# Patient Record
Sex: FEMALE | Race: WHITE | Employment: FULL TIME | URBAN - METROPOLITAN AREA
[De-identification: names, ages, dates, MRNs, and addresses within clinical notes are randomized per-mention and may not be internally consistent; named-entity substitution may affect disease eponyms.]

---

## 2019-07-03 ENCOUNTER — APPOINTMENT (OUTPATIENT)
Dept: GENERAL RADIOLOGY | Age: 59
End: 2019-07-03
Attending: EMERGENCY MEDICINE
Payer: SELF-PAY

## 2019-07-03 ENCOUNTER — HOSPITAL ENCOUNTER (EMERGENCY)
Age: 59
Discharge: HOME | End: 2019-07-03
Attending: EMERGENCY MEDICINE | Admitting: EMERGENCY MEDICINE
Payer: SELF-PAY

## 2019-07-03 VITALS
TEMPERATURE: 97.8 F | DIASTOLIC BLOOD PRESSURE: 86 MMHG | SYSTOLIC BLOOD PRESSURE: 184 MMHG | HEIGHT: 65 IN | RESPIRATION RATE: 18 BRPM | OXYGEN SATURATION: 99 % | BODY MASS INDEX: 21.66 KG/M2 | HEART RATE: 84 BPM | WEIGHT: 130 LBS

## 2019-07-03 DIAGNOSIS — S92.902A CLOSED FRACTURE OF LEFT FOOT, INITIAL ENCOUNTER: Primary | ICD-10-CM

## 2019-07-03 PROCEDURE — 74011250637 HC RX REV CODE- 250/637: Performed by: EMERGENCY MEDICINE

## 2019-07-03 PROCEDURE — 77030008299 HC SPLNT FBRGLS SCTCH 3M -B

## 2019-07-03 PROCEDURE — 99283 EMERGENCY DEPT VISIT LOW MDM: CPT | Performed by: EMERGENCY MEDICINE

## 2019-07-03 PROCEDURE — 75810000053 HC SPLINT APPLICATION: Performed by: EMERGENCY MEDICINE

## 2019-07-03 PROCEDURE — 99284 EMERGENCY DEPT VISIT MOD MDM: CPT | Performed by: EMERGENCY MEDICINE

## 2019-07-03 PROCEDURE — 73630 X-RAY EXAM OF FOOT: CPT

## 2019-07-03 RX ORDER — HYDROCODONE BITARTRATE AND ACETAMINOPHEN 5; 325 MG/1; MG/1
1 TABLET ORAL
Qty: 20 TAB | Refills: 0 | Status: SHIPPED | OUTPATIENT
Start: 2019-07-03 | End: 2019-07-06

## 2019-07-03 RX ORDER — HYDROCODONE BITARTRATE AND ACETAMINOPHEN 5; 325 MG/1; MG/1
1 TABLET ORAL ONCE
Status: COMPLETED | OUTPATIENT
Start: 2019-07-03 | End: 2019-07-03

## 2019-07-03 RX ADMIN — HYDROCODONE BITARTRATE AND ACETAMINOPHEN 1 TABLET: 5; 325 TABLET ORAL at 10:09

## 2019-07-03 NOTE — ED NOTES
I have reviewed discharge instructions with the patient. The patient verbalized understanding. Patient left ED via Discharge Method: ambulatory to Home with grand daughter. Opportunity for questions and clarification provided. Patient given 1 scripts. To continue your aftercare when you leave the hospital, you may receive an automated call from our care team to check in on how you are doing. This is a free service and part of our promise to provide the best care and service to meet your aftercare needs.  If you have questions, or wish to unsubscribe from this service please call [phone]. Thank you for Choosing our [facility] Emergency Department.

## 2019-07-03 NOTE — ED PROVIDER NOTES
59-year-old female tripped over the carpet last night causing her left foot to bend back behind her. She reports significant pain and swelling in the dorsal foot radiating into her ankle. Denies knee pain. No other injury. Foot Injury    Pertinent negatives include no numbness. History reviewed. No pertinent past medical history. Past Surgical History:   Procedure Laterality Date    HX CHOLECYSTECTOMY      HX GYN           History reviewed. No pertinent family history. Social History     Socioeconomic History    Marital status:      Spouse name: Not on file    Number of children: Not on file    Years of education: Not on file    Highest education level: Not on file   Occupational History    Not on file   Social Needs    Financial resource strain: Not on file    Food insecurity:     Worry: Not on file     Inability: Not on file    Transportation needs:     Medical: Not on file     Non-medical: Not on file   Tobacco Use    Smoking status: Current Every Day Smoker    Smokeless tobacco: Never Used   Substance and Sexual Activity    Alcohol use: Never     Frequency: Never    Drug use: Never    Sexual activity: Not on file   Lifestyle    Physical activity:     Days per week: Not on file     Minutes per session: Not on file    Stress: Not on file   Relationships    Social connections:     Talks on phone: Not on file     Gets together: Not on file     Attends Hindu service: Not on file     Active member of club or organization: Not on file     Attends meetings of clubs or organizations: Not on file     Relationship status: Not on file    Intimate partner violence:     Fear of current or ex partner: Not on file     Emotionally abused: Not on file     Physically abused: Not on file     Forced sexual activity: Not on file   Other Topics Concern    Not on file   Social History Narrative    Not on file         ALLERGIES: Patient has no known allergies.     Review of Systems Constitutional: Negative for fever. Musculoskeletal: Positive for arthralgias and joint swelling. Neurological: Negative for weakness and numbness. Vitals:    07/03/19 0901   BP: (!) 191/97   Pulse: 84   Resp: 18   Temp: 97.8 °F (36.6 °C)   SpO2: 98%   Weight: 59 kg (130 lb)   Height: 5' 5\" (1.651 m)            Physical Exam   Constitutional: She appears well-developed and well-nourished. HENT:   Head: Normocephalic and atraumatic. Eyes: Pupils are equal, round, and reactive to light. EOM are normal.   Neck: Neck supple. Musculoskeletal:        Left foot: There is decreased range of motion, tenderness, bony tenderness and swelling. There is no crepitus. Feet:    Neurological: She is alert. She has normal strength. No sensory deficit. Skin: Skin is dry. Nursing note and vitals reviewed. MDM  Number of Diagnoses or Management Options  Diagnosis management comments: Parts of this document were created using dragon voice recognition software. The chart has been reviewed but errors may still be present. Significant swelling suggesting ligamentous injury. No evidence of Lisfranc injury, but placed in a posterior splint and given crutches with worth of follow-up for further evaluation and imaging. dw Dr Moira García, ortho, for fu. I discussed the results of all labs, procedures, radiographs, and treatments with the patient and available family. Treatment plan is agreed upon and the patient is ready for discharge. Questions about treatment in the ED and differential diagnosis of presenting condition were answered. Patient was given verbal discharge instructions including, but not limited to, importance of returning to the emergency department for any concern of worsening or continued symptoms. Instructions were given to follow up with a primary care provider or specialist within 1-2 days.   Adverse effects of medications, if prescribed, were discussed and patient was advised to refrain from significant physical activity until followed up by primary care physician and to not drive or operate heavy machinery after taking any sedating substances. Amount and/or Complexity of Data Reviewed  Tests in the radiology section of CPT®: ordered and reviewed (Xr Foot Lt Min 3 V    Result Date: 7/3/2019  Exam:  Left foot radiographs History:  pain, fall with injury, 60 years Female Comparison: None available Findings: Question lucent line at the base of the second metatarsal, although this is not definitely seen on lateral view, an acute nondisplaced fracture cannot be entirely excluded. Mild degenerative changes are seen of the interphalangeal joints of the toes. Normal alignment. Mild diffuse osteopenia. No evidence of ankle joint effusion. Visualized soft tissues otherwise unremarkable. Impression: Cannot entirely exclude an acute nondisplaced fracture of the base of the second metatarsal.  Correlation with clinical exam is recommended.     )           Splint, Short Leg  Date/Time: 7/3/2019 10:12 AM  Performed by: Yuly Calhoun MD  Authorized by: Yuly Calhoun MD     Consent:     Consent obtained:  Verbal    Consent given by:  Patient    Risks discussed:  Pain and swelling    Alternatives discussed:  Referral  Pre-procedure details:     Sensation:  Normal  Procedure details:     Laterality:  Left    Location:  Foot    Foot:  L foot    Splint type:  Short leg    Supplies:  Ortho-Glass  Post-procedure details:     Pain:  Improved    Sensation:  Normal    Patient tolerance of procedure:   Tolerated well, no immediate complications

## 2019-07-03 NOTE — DISCHARGE INSTRUCTIONS
Apply ice and elevate. Call orthopedics today to arrange appointment within one week. Return for worsening or concerning symptoms. Use crutches without bearing weight on left foot.

## 2019-07-13 ENCOUNTER — HOSPITAL ENCOUNTER (EMERGENCY)
Age: 59
Discharge: HOME | End: 2019-07-13
Attending: EMERGENCY MEDICINE
Payer: SELF-PAY

## 2019-07-13 ENCOUNTER — APPOINTMENT (OUTPATIENT)
Dept: GENERAL RADIOLOGY | Age: 59
End: 2019-07-13
Attending: EMERGENCY MEDICINE
Payer: SELF-PAY

## 2019-07-13 VITALS
WEIGHT: 130 LBS | BODY MASS INDEX: 21.66 KG/M2 | OXYGEN SATURATION: 96 % | RESPIRATION RATE: 19 BRPM | HEART RATE: 69 BPM | HEIGHT: 65 IN | DIASTOLIC BLOOD PRESSURE: 96 MMHG | SYSTOLIC BLOOD PRESSURE: 184 MMHG | TEMPERATURE: 97.9 F

## 2019-07-13 DIAGNOSIS — I10 HYPERTENSION, UNSPECIFIED TYPE: Primary | ICD-10-CM

## 2019-07-13 DIAGNOSIS — S20.212A CONTUSION OF RIBS, LEFT, INITIAL ENCOUNTER: ICD-10-CM

## 2019-07-13 LAB
ANION GAP SERPL CALC-SCNC: 7 MMOL/L (ref 7–16)
ATRIAL RATE: 69 BPM
BUN SERPL-MCNC: 14 MG/DL (ref 6–23)
CALCIUM SERPL-MCNC: 9.4 MG/DL (ref 8.3–10.4)
CALCULATED P AXIS, ECG09: 39 DEGREES
CALCULATED R AXIS, ECG10: 0 DEGREES
CALCULATED T AXIS, ECG11: 27 DEGREES
CHLORIDE SERPL-SCNC: 105 MMOL/L (ref 98–107)
CO2 SERPL-SCNC: 28 MMOL/L (ref 21–32)
CREAT SERPL-MCNC: 0.76 MG/DL (ref 0.6–1)
DIAGNOSIS, 93000: NORMAL
GLUCOSE SERPL-MCNC: 96 MG/DL (ref 65–100)
P-R INTERVAL, ECG05: 144 MS
POTASSIUM SERPL-SCNC: 4.2 MMOL/L (ref 3.5–5.1)
Q-T INTERVAL, ECG07: 438 MS
QRS DURATION, ECG06: 86 MS
QTC CALCULATION (BEZET), ECG08: 469 MS
SODIUM SERPL-SCNC: 140 MMOL/L (ref 136–145)
VENTRICULAR RATE, ECG03: 69 BPM

## 2019-07-13 PROCEDURE — 96374 THER/PROPH/DIAG INJ IV PUSH: CPT | Performed by: EMERGENCY MEDICINE

## 2019-07-13 PROCEDURE — 80048 BASIC METABOLIC PNL TOTAL CA: CPT

## 2019-07-13 PROCEDURE — 74011250636 HC RX REV CODE- 250/636: Performed by: EMERGENCY MEDICINE

## 2019-07-13 PROCEDURE — 74011250637 HC RX REV CODE- 250/637: Performed by: EMERGENCY MEDICINE

## 2019-07-13 PROCEDURE — 71100 X-RAY EXAM RIBS UNI 2 VIEWS: CPT

## 2019-07-13 PROCEDURE — 73630 X-RAY EXAM OF FOOT: CPT

## 2019-07-13 PROCEDURE — 93005 ELECTROCARDIOGRAM TRACING: CPT | Performed by: EMERGENCY MEDICINE

## 2019-07-13 PROCEDURE — 99285 EMERGENCY DEPT VISIT HI MDM: CPT | Performed by: EMERGENCY MEDICINE

## 2019-07-13 RX ORDER — AMLODIPINE BESYLATE 10 MG/1
10 TABLET ORAL
Status: COMPLETED | OUTPATIENT
Start: 2019-07-13 | End: 2019-07-13

## 2019-07-13 RX ORDER — KETOROLAC TROMETHAMINE 30 MG/ML
15 INJECTION, SOLUTION INTRAMUSCULAR; INTRAVENOUS
Status: COMPLETED | OUTPATIENT
Start: 2019-07-13 | End: 2019-07-13

## 2019-07-13 RX ORDER — AMLODIPINE BESYLATE 10 MG/1
10 TABLET ORAL DAILY
Qty: 30 TAB | Refills: 2 | Status: SHIPPED | OUTPATIENT
Start: 2019-07-13 | End: 2019-10-11

## 2019-07-13 RX ORDER — HYDROCODONE BITARTRATE AND ACETAMINOPHEN 5; 325 MG/1; MG/1
1 TABLET ORAL
Qty: 12 TAB | Refills: 0 | Status: SHIPPED | OUTPATIENT
Start: 2019-07-13 | End: 2019-07-16

## 2019-07-13 RX ADMIN — KETOROLAC TROMETHAMINE 15 MG: 30 INJECTION, SOLUTION INTRAMUSCULAR at 07:37

## 2019-07-13 RX ADMIN — AMLODIPINE BESYLATE 10 MG: 10 TABLET ORAL at 07:37

## 2019-07-13 NOTE — ED PROVIDER NOTES
HPI:  41-year-old female here with left ribs pain status post fall. Was seen here 2 weeks ago and had diagnoses of left foot fracture. Unable to see orthopedics because she does not have insurance or money. Today while trying to get onto the commode she fell and hit the left side of her ribs against the tub. Pain with movement, breathing. No hemoptysis. No bruising. Did not hit head. No pain at the wrist, knee, hip    ROS  Constitutional: No fever, no chills  Skin: no rash  Eye: No vision changes  ENMT: No sore throat  Respiratory: No shortness of breath, no cough  Cardiovascular: + chest pain, no palpitations  Gastrointestinal: No vomiting, no nausea, no diarrhea, no abdominal pain  : No dysuria  MSK: No back pain, no muscle pain, + joint pain  Neuro: No headache, no change in mental status, no numbness, no tingling, no weakness  Psych:   Endocrine:   All other review of systems positive per history of present illness and the above otherwise negative or noncontributory. Visit Vitals  BP (!) 211/107 (BP 1 Location: Left arm, BP Patient Position: At rest)   Pulse 90   Temp 97.9 °F (36.6 °C)   Resp 18   Ht 5' 5\" (1.651 m)   Wt 59 kg (130 lb)   SpO2 97%   BMI 21.63 kg/m²     History reviewed. No pertinent past medical history.   Past Surgical History:   Procedure Laterality Date    HX CHOLECYSTECTOMY      HX GYN       None         Adult Exam   General: alert, no acute distress  Head: normocephalic, atraumatic  ENT: moist mucous membranes  Neck: supple, non-tender; full range of motion  Cardiovascular: regular rate and rhythm, normal peripheral perfusion, no edema  CHEST WALL: Tenderness to palpation over the inferior lateral left rib space without any paradoxical chest wall movement or open fracture  Respiratory:  normal respirations; no wheezing, rales or rhonchi  Gastrointestinal: soft, non-tender; no rebound or guarding, no peritoneal signs, no distension  Back: non-tender, full range of motion  Musculoskeletal: normal range of motion, normal strength, no gross deformities  Left foot is in a splint  Neurological: alert and oriented x 4, no gross focal deficits; normal speech  Psychiatric: cooperative; appropriate mood and affect    MDM:  Note blood pressures is very elevated. Has a diagnosis of high blood pressure however not taken any medication because she does not have medical coverage. Would check EKG, BMP. Will likely start her on Amlodipine for HBP. Will obtain Left ribs XR and left foot xray. 8:13 AM  Normal kidney function. EKG without any acute STEMI. Rate is 69 normal sinus rhythm. Normal axis. Slightly prolonged QT interval   X-ray without any acute fracture of the ribs. This suspected Lisfranc fracture of the left midfoot. She still has not seen orthopedics. I will give her information to case management since she is currently without medical coverage and does not have money and is unable to see them and pay for the co-pay. Blood pressure is also elevated. We'll start on amlodipine 10 mg daily. We'll give prescription for 90 days of amlodipine and 12 Norco for pain as needed. A dose of amlodipine was given here. She is stable for discharge.   No signs of end organ injury noted on exam    Recent Results (from the past 12 hour(s))   METABOLIC PANEL, BASIC    Collection Time: 07/13/19  7:41 AM   Result Value Ref Range    Sodium 140 136 - 145 mmol/L    Potassium 4.2 3.5 - 5.1 mmol/L    Chloride 105 98 - 107 mmol/L    CO2 28 21 - 32 mmol/L    Anion gap 7 7 - 16 mmol/L    Glucose 96 65 - 100 mg/dL    BUN 14 6 - 23 MG/DL    Creatinine 0.76 0.6 - 1.0 MG/DL    GFR est AA >60 >60 ml/min/1.73m2    GFR est non-AA >60 >60 ml/min/1.73m2    Calcium 9.4 8.3 - 10.4 MG/DL   EKG, 12 LEAD, INITIAL    Collection Time: 07/13/19  7:57 AM   Result Value Ref Range    Ventricular Rate 69 BPM    Atrial Rate 69 BPM    P-R Interval 144 ms    QRS Duration 86 ms    Q-T Interval 438 ms    QTC Calculation (Bezet) 469 ms    Calculated P Axis 39 degrees    Calculated R Axis 0 degrees    Calculated T Axis 27 degrees    Diagnosis       Normal sinus rhythm  Possible Left atrial enlargement  Prolonged QT  Abnormal ECG  No previous ECGs available         Xr Ribs Lt Uni 2 V    Result Date: 7/13/2019  Left rib series CLINICAL INDICATION left rib pain after a fall last night FINDINGS: Three views of the left ribs submitted. The left lung is well-expanded and clear. There is no pneumothorax. A superficial markers placed over the lower left lateral rib cage. No underlying fractures noted deep to the marker. IMPRESSION: No displaced left-sided rib fracture or pneumothorax evident. Left foot Clinical location: Left foot pain after a fall last night. FINDINGS: Three views of the left foot compared to a similar exam dated 7/3/2019 is mildly limited by overlying splint material. The subtle fracture line at the base of the second metatarsal is not significantly changed. No definite acute or new fracture. IMPRESSION: Subtle fracture at the base of the second metatarsal, unchanged. May want to consider further evaluation of the left midfoot with MRI for a possible Lisfranc ligament injury as an outpatient. Xr Foot Lt Min 3 V    Result Date: 7/13/2019  Left rib series CLINICAL INDICATION left rib pain after a fall last night FINDINGS: Three views of the left ribs submitted. The left lung is well-expanded and clear. There is no pneumothorax. A superficial markers placed over the lower left lateral rib cage. No underlying fractures noted deep to the marker. IMPRESSION: No displaced left-sided rib fracture or pneumothorax evident. Left foot Clinical location: Left foot pain after a fall last night. FINDINGS: Three views of the left foot compared to a similar exam dated 7/3/2019 is mildly limited by overlying splint material. The subtle fracture line at the base of the second metatarsal is not significantly changed.  No definite acute or new fracture. IMPRESSION: Subtle fracture at the base of the second metatarsal, unchanged. May want to consider further evaluation of the left midfoot with MRI for a possible Lisfranc ligament injury as an outpatient. Xr Foot Lt Min 3 V    Result Date: 7/5/2019  Exam:  Left foot radiographs History:  pain, fall with injury, 60 years Female Comparison: None available Findings: Question lucent line at the base of the second metatarsal, although this is not definitely seen on lateral view, an acute nondisplaced fracture cannot be entirely excluded. Mild degenerative changes are seen of the interphalangeal joints of the toes. Normal alignment. Mild diffuse osteopenia. No evidence of ankle joint effusion. Visualized soft tissues otherwise unremarkable. Impression: Cannot entirely exclude an acute nondisplaced fracture of the base of the second metatarsal.  Correlation with clinical exam is recommended. Dragon voice recognition software was used to create this note. Although the note has been reviewed and corrected where necessary, additional errors may have been overlooked and remain in the text.

## 2019-07-13 NOTE — ED TRIAGE NOTES
Reports she got up last night to got to the bathroom and fell trying to stay off her injured left foot into the tub, injuring her ribs. No bruising or swelling noted at this time.

## 2019-07-13 NOTE — DISCHARGE INSTRUCTIONS
Establish care with a local primary physician. You need to be seen by an orthopedic. I have given your information to the . They may contact you on Monday or Tuesday with some additional resources. Start Blood pressure medication Amlodipine. Take Norco as needed for pain in the left ribs and left foot. Use crutches.

## 2019-07-15 NOTE — PROGRESS NOTES
Referral from Dr Peg Habermann to follow up with patient regarding orthopedic follow up and PCP. Demographics on face sheet verified. Patient states no PCP and no insurance. Explained the [address] program in detail and provided patient with resources. Contact information for [facility] with [address] [facility] given to patient and patient encouraged to follow up with her as soon as possible. Patient also given contact information for Wagner Dalton with [facility] and encouraged to call to get screened for Medicaid/Insurance eligibility and/or financial assistance. Also notified patient that I would fax referral to [address] [place] [address] but they would not get back to her before next week.